# Patient Record
Sex: FEMALE | Race: WHITE | Employment: PART TIME | URBAN - NONMETROPOLITAN AREA
[De-identification: names, ages, dates, MRNs, and addresses within clinical notes are randomized per-mention and may not be internally consistent; named-entity substitution may affect disease eponyms.]

---

## 2019-05-08 ENCOUNTER — HOSPITAL ENCOUNTER (EMERGENCY)
Age: 29
Discharge: HOME | End: 2019-05-08
Attending: FAMILY MEDICINE
Payer: MEDICARE

## 2019-05-08 VITALS
SYSTOLIC BLOOD PRESSURE: 153 MMHG | HEART RATE: 66 BPM | TEMPERATURE: 98.3 F | OXYGEN SATURATION: 98 % | WEIGHT: 293 LBS | HEIGHT: 67 IN | RESPIRATION RATE: 18 BRPM | DIASTOLIC BLOOD PRESSURE: 94 MMHG | BODY MASS INDEX: 45.99 KG/M2

## 2019-05-08 DIAGNOSIS — H10.32 ACUTE BACTERIAL CONJUNCTIVITIS OF LEFT EYE: Primary | ICD-10-CM

## 2019-05-08 PROCEDURE — 99283 EMERGENCY DEPT VISIT LOW MDM: CPT

## 2019-05-08 PROCEDURE — 6370000000 HC RX 637 (ALT 250 FOR IP): Performed by: FAMILY MEDICINE

## 2019-05-08 RX ORDER — ERYTHROMYCIN 5 MG/G
OINTMENT OPHTHALMIC EVERY 6 HOURS
Qty: 1 TUBE | Refills: 0 | Status: SHIPPED | OUTPATIENT
Start: 2019-05-08 | End: 2019-05-15

## 2019-05-08 RX ORDER — ERYTHROMYCIN 5 MG/G
OINTMENT OPHTHALMIC ONCE
Status: COMPLETED | OUTPATIENT
Start: 2019-05-08 | End: 2019-05-08

## 2019-05-08 RX ADMIN — ERYTHROMYCIN: 5 OINTMENT OPHTHALMIC at 23:04

## 2019-05-08 ASSESSMENT — PAIN DESCRIPTION - LOCATION: LOCATION: EYE

## 2019-05-08 ASSESSMENT — PAIN DESCRIPTION - FREQUENCY: FREQUENCY: CONTINUOUS

## 2019-05-08 ASSESSMENT — PAIN DESCRIPTION - PAIN TYPE: TYPE: ACUTE PAIN

## 2019-05-08 ASSESSMENT — PAIN DESCRIPTION - DESCRIPTORS: DESCRIPTORS: SORE

## 2019-05-08 ASSESSMENT — PAIN SCALES - GENERAL: PAINLEVEL_OUTOF10: 4

## 2019-05-08 ASSESSMENT — PAIN DESCRIPTION - ORIENTATION: ORIENTATION: RIGHT

## 2019-05-10 ASSESSMENT — ENCOUNTER SYMPTOMS
ABDOMINAL DISTENTION: 0
COUGH: 0
DIARRHEA: 0
PHOTOPHOBIA: 1
RHINORRHEA: 0
EYE PAIN: 1
ABDOMINAL PAIN: 0
EYE REDNESS: 1
SHORTNESS OF BREATH: 0

## 2019-05-10 NOTE — ED PROVIDER NOTES
[facility] COMPLAINT    Chief Complaint   Patient presents with    Conjunctivitis     Pt states eye redness, irritation and sensitivity to light since this AM;        HPI    Guillermo Srinivasan is a 26 y.o. female who presents to ED she reports redness also some minor irritation of left eye with no known foreign bodies. She thinks she has pinkeye. She is not in her contacts when she started having symptoms. He denies any injury to the eye fevers no cough no shortness of breath no known sick contacts with similar symptoms. PAST MEDICAL HISTORY    History reviewed. No pertinent past medical history. SURGICAL HISTORY    Past Surgical History:   Procedure Laterality Date    KNEE SURGERY Left 2003    KNEE SURGERY Right 2011    x2 one in 2011 and one in 2013       CURRENT MEDICATIONS    Discharge Medication List as of 5/8/2019 11:14 PM      START taking these medications    Details   erythromycin (ROMYCIN) 5 MG/GM ophthalmic ointment Place into the right eye every 6 hours for 7 days, Right Eye, EVERY 6 HOURS Starting Wed 5/8/2019, Until Wed 5/15/2019, For 7 days, Disp-1 Tube, R-0, Print             ALLERGIES    No Known Allergies    FAMILY HISTORY    History reviewed. No pertinent family history.     SOCIAL HISTORY    Social History     Socioeconomic History    Marital status: Single     Spouse name: None    Number of children: None    Years of education: None    Highest education level: None   Occupational History    None   Social Needs    Financial resource strain: None    Food insecurity:     Worry: None     Inability: None    Transportation needs:     Medical: None     Non-medical: None   Tobacco Use    Smoking status: Former Smoker    Smokeless tobacco: Never Used   Substance and Sexual Activity    Alcohol use: Not Currently    Drug use: Never    Sexual activity: None   Lifestyle    Physical activity:     Days per week: None     Minutes per session: None    Stress: None   Relationships    Social connections:     Talks on phone: None     Gets together: None     Attends [employer] service: None     Active member of club or organization: None     Attends meetings of clubs or organizations: None     Relationship status: None    Intimate partner violence:     Fear of current or ex partner: None     Emotionally abused: None     Physically abused: None     Forced sexual activity: None   Other Topics Concern    None   Social History Narrative    None       REVIEW OF SYSTEMS    Review of Systems   Constitutional: Negative for chills, fatigue and fever. HENT: Negative for congestion and rhinorrhea. Eyes: Positive for photophobia, pain and redness. Respiratory: Negative for cough and shortness of breath. Gastrointestinal: Negative for abdominal distention, abdominal pain and diarrhea. Genitourinary: Negative for decreased urine volume and dysuria. Skin: Negative for rash and wound. Neurological: Negative for syncope and headaches. Psychiatric/Behavioral: Negative for agitation and behavioral problems. PHYSICAL EXAM    VITAL SIGNS: BP (!) 153/94   Pulse 66   Temp 98.3 °F (36.8 °C) (Oral)   Resp 18   Ht 5' 7\" (1.702 m)   Wt 298 lb (135.2 kg)   LMP 05/08/2019   SpO2 98%   BMI 46.67 kg/m²    Physical Exam   Constitutional: She is oriented to person, place, and time. She appears well-developed and well-nourished. No distress. HENT:   Head: Normocephalic and atraumatic. Right Ear: External ear normal.   Left Ear: External ear normal.   Eyes: Lids are normal. Lids are everted and swept, no foreign bodies found. Right eye exhibits no discharge. Left eye exhibits no discharge. Left conjunctiva is injected. Left conjunctiva has no hemorrhage. Right eye exhibits normal extraocular motion and no nystagmus. Left eye exhibits normal extraocular motion and no nystagmus. Neck: No JVD present. No tracheal deviation present.    Cardiovascular: Normal rate and regular rhythm. Exam reveals no gallop and no friction rub. No murmur heard. Pulmonary/Chest: Effort normal and breath sounds normal. No respiratory distress. She has no wheezes. She has no rales. Abdominal: Soft. Bowel sounds are normal.   Musculoskeletal: She exhibits no edema. Neurological: She is alert and oriented to person, place, and time. No cranial nerve deficit. cn2-12 grossly intact   Skin: Capillary refill takes less than 2 seconds. No rash noted. She is not diaphoretic. No erythema. Psychiatric: She has a normal mood and affect. Her behavior is normal. Judgment and thought content normal.   Nursing note and vitals reviewed. RADIOLOGY    No orders to display           Labs  No results found for this visit on 05/08/19. Summation      Patient Course: Patient treated with erythromycin ointment and she is to not wear her contacts until she sees her optometrist or ophthalmologist.  She voiced understanding if any time she develops vision loss or severe pain she is to be seen or if she is not getting better she is to be seen. ED Medications administered this visit:    Medications   erythromycin [facility]) ophthalmic ointment ( Right Eye Given 5/8/19 2304)       New Prescriptions from this visit:    Discharge Medication List as of 5/8/2019 11:14 PM      START taking these medications    Details   erythromycin (ROMYCIN) 5 MG/GM ophthalmic ointment Place into the right eye every 6 hours for 7 days, Right Eye, EVERY 6 HOURS Starting Wed 5/8/2019, Until Wed 5/15/2019, For 7 days, Disp-1 Tube, R-0, Print             Follow-up:  [facility]P [facility] ED  [address] [facility] [address] [phone]    As needed, If symptoms worsen        Final Impression:   1.  Acute bacterial conjunctivitis of left eye Stable              (Please note that portions of this note were completed with a voice recognition program.  Efforts were made to edit the dictations but occasionally words are mis-transcribed.)        Luis Alfredo Frazier MD  05/10/19 7106

## 2019-08-28 ENCOUNTER — HOSPITAL ENCOUNTER (EMERGENCY)
Age: 29
Discharge: HOME | End: 2019-08-28
Attending: FAMILY MEDICINE
Payer: MEDICARE

## 2019-08-28 VITALS
TEMPERATURE: 98.1 F | RESPIRATION RATE: 20 BRPM | OXYGEN SATURATION: 97 % | WEIGHT: 293 LBS | BODY MASS INDEX: 45.99 KG/M2 | HEIGHT: 67 IN | HEART RATE: 98 BPM

## 2019-08-28 DIAGNOSIS — L02.91 ABSCESS: Primary | ICD-10-CM

## 2019-08-28 PROCEDURE — 87205 SMEAR GRAM STAIN: CPT

## 2019-08-28 PROCEDURE — 87077 CULTURE AEROBIC IDENTIFY: CPT

## 2019-08-28 PROCEDURE — 99283 EMERGENCY DEPT VISIT LOW MDM: CPT

## 2019-08-28 PROCEDURE — 87186 SC STD MICRODIL/AGAR DIL: CPT

## 2019-08-28 PROCEDURE — 10061 I&D ABSCESS COMP/MULTIPLE: CPT

## 2019-08-28 PROCEDURE — 87070 CULTURE OTHR SPECIMN AEROBIC: CPT

## 2019-08-28 RX ORDER — CEPHALEXIN 500 MG/1
500 CAPSULE ORAL ONCE
Status: DISCONTINUED | OUTPATIENT
Start: 2019-08-28 | End: 2019-08-28 | Stop reason: HOSPADM

## 2019-08-28 RX ORDER — CEPHALEXIN 500 MG/1
500 CAPSULE ORAL 4 TIMES DAILY
Qty: 40 CAPSULE | Refills: 0 | Status: SHIPPED | OUTPATIENT
Start: 2019-08-28 | End: 2020-09-09 | Stop reason: ALTCHOICE

## 2019-08-28 RX ORDER — SULFAMETHOXAZOLE AND TRIMETHOPRIM 800; 160 MG/1; MG/1
1 TABLET ORAL ONCE
Status: DISCONTINUED | OUTPATIENT
Start: 2019-08-28 | End: 2019-08-28 | Stop reason: HOSPADM

## 2019-08-28 RX ORDER — SULFAMETHOXAZOLE AND TRIMETHOPRIM 800; 160 MG/1; MG/1
1 TABLET ORAL 2 TIMES DAILY
Qty: 20 TABLET | Refills: 0 | Status: SHIPPED | OUTPATIENT
Start: 2019-08-28 | End: 2019-09-07

## 2019-08-28 RX ORDER — NITROGLYCERIN 0.4 MG/1
0.4 TABLET SUBLINGUAL EVERY 5 MIN PRN
COMMUNITY
End: 2020-09-09 | Stop reason: ALTCHOICE

## 2019-08-28 ASSESSMENT — PAIN DESCRIPTION - PROGRESSION: CLINICAL_PROGRESSION: GRADUALLY WORSENING

## 2019-08-28 ASSESSMENT — PAIN DESCRIPTION - LOCATION: LOCATION: ARM

## 2019-08-28 ASSESSMENT — PAIN DESCRIPTION - DESCRIPTORS: DESCRIPTORS: CONSTANT

## 2019-08-28 ASSESSMENT — PAIN DESCRIPTION - PAIN TYPE: TYPE: ACUTE PAIN

## 2019-08-28 ASSESSMENT — PAIN SCALES - GENERAL: PAINLEVEL_OUTOF10: 3

## 2019-08-28 ASSESSMENT — PAIN DESCRIPTION - ONSET: ONSET: ON-GOING

## 2019-08-28 ASSESSMENT — PAIN DESCRIPTION - FREQUENCY: FREQUENCY: CONTINUOUS

## 2019-08-28 ASSESSMENT — PAIN DESCRIPTION - ORIENTATION: ORIENTATION: LEFT

## 2019-08-29 NOTE — ED PROVIDER NOTES
[address] [facility]  eMERGENCY dEPARTMENT eNCOUnter          [address] [facility]       Chief Complaint   Patient presents with    Abscess     left arm       Nurses Notes reviewed and I agree except as noted in the HPI. HISTORY OF PRESENT ILLNESS    Deysi London is a 26 y.o. female who presents to the emergency room via private vehicle, patient complaining of abscess of the left volar forearm, patient does acknowledge injecting heroin recently, onset of symptoms 2 days prior. Patient states she did try to take some doxycycline she had left at home from a prior prescription without relief. Denies fever chills denies nausea vomiting. REVIEW OF SYSTEMS     Review of Systems   Skin: Positive for wound. All other systems reviewed and are negative. PAST MEDICAL HISTORY    has no past medical history on file. SURGICAL HISTORY      has a past surgical history that includes knee surgery (Left, 2003) and knee surgery (Right, 2011). CURRENT MEDICATIONS       Discharge Medication List as of 8/28/2019  7:49 PM      CONTINUE these medications which have NOT CHANGED    Details   nitroGLYCERIN (NITROSTAT) 0.4 MG SL tablet Place 0.4 mg under the tongue every 5 minutes as needed for Chest pain up to max of 3 total doses. If no relief after 1 dose, call 911. Historical Med      Buprenorphine HCl-Naloxone HCl (SUBOXONE SL) Place 0.8 mg under the tongueHistorical Med             ALLERGIES     has No Known Allergies. FAMILY HISTORY     has no family status information on file. family history is not on file. SOCIAL HISTORY      reports that she has quit smoking. She has never used smokeless tobacco. She reports that she drank alcohol. She reports that she has current or past drug history. Drug: IV.    PHYSICAL EXAM     INITIAL VITALS:  height is 5' 7\" (1.702 m) and weight is 295 lb 1.6 oz (133.9 kg). Her oral temperature is 98.1 °F (36.7 °C). Her pulse is 98.  Her respiration is 20 and

## 2019-08-31 LAB
CULTURE: ABNORMAL
DIRECT EXAM: ABNORMAL
DIRECT EXAM: ABNORMAL
Lab: ABNORMAL
SPECIMEN DESCRIPTION: ABNORMAL

## 2020-09-09 ENCOUNTER — HOSPITAL ENCOUNTER (EMERGENCY)
Age: 30
Discharge: HOME | End: 2020-09-09
Attending: INTERNAL MEDICINE
Payer: MEDICARE

## 2020-09-09 ENCOUNTER — APPOINTMENT (OUTPATIENT)
Dept: GENERAL RADIOLOGY | Age: 30
End: 2020-09-09
Payer: MEDICARE

## 2020-09-09 VITALS
DIASTOLIC BLOOD PRESSURE: 77 MMHG | OXYGEN SATURATION: 93 % | WEIGHT: 270 LBS | TEMPERATURE: 97.9 F | SYSTOLIC BLOOD PRESSURE: 145 MMHG | HEART RATE: 76 BPM | RESPIRATION RATE: 18 BRPM | BODY MASS INDEX: 40.92 KG/M2 | HEIGHT: 68 IN

## 2020-09-09 PROCEDURE — 73630 X-RAY EXAM OF FOOT: CPT

## 2020-09-09 PROCEDURE — 99283 EMERGENCY DEPT VISIT LOW MDM: CPT

## 2020-09-09 ASSESSMENT — PAIN DESCRIPTION - DESCRIPTORS: DESCRIPTORS: ACHING;DULL

## 2020-09-09 ASSESSMENT — PAIN DESCRIPTION - PAIN TYPE: TYPE: ACUTE PAIN

## 2020-09-09 ASSESSMENT — PAIN DESCRIPTION - ORIENTATION: ORIENTATION: RIGHT

## 2020-09-09 ASSESSMENT — PAIN DESCRIPTION - LOCATION: LOCATION: FOOT

## 2020-09-09 ASSESSMENT — PAIN SCALES - GENERAL: PAINLEVEL_OUTOF10: 5

## 2020-09-09 NOTE — ED PROVIDER NOTES
[facility] ED  EMERGENCY DEPARTMENT ENCOUNTER      Pt Name: Anastacio Bowden  MRN: [id number]  [place] 1990  Date of evaluation: 9/9/2020  Provider: Damaris Jeffers MD    CHIEF COMPLAINT       Chief Complaint   Patient presents with    Foot Pain     Right foot pain for past 3-4 days. Pt slipped in the bathroom and believes she broke her right foot again. HISTORY OF PRESENT ILLNESS   (Location/Symptom, Timing/Onset, Context/Setting, Quality, Duration, Modifying Factors, Severity)  Note limiting factors. Anastacio Bowden is a 31 y.o. female who has a history of right foot fracture, presents to the emergency department for evaluation and management of right foot pain after she slipped in the bathroom 3 to 4 days ago. It is located at the bottom of her right heel region. HPI    Nursing Notes were reviewed. REVIEW OF SYSTEMS    (2-9 systems for level 4, 10 or more for level 5)       REVIEW OF SYSTEMS    Constitutional: Negative for fatigue and fever. Musculoskeletal: Positive for right foot pain, Negative for arthralgias, back pain and neck pain. Skin: Negative for color change, pallor, rash and wound. Neurological: Negative for dizziness, speech difficulty, weakness, distal tingling/numbness and headaches. Except as noted above the remainder of the review of systems was reviewed and negative. PASTMEDICAL HISTORY   History reviewed. No pertinent past medical history. SURGICAL HISTORY       Past Surgical History:   Procedure Laterality Date    KNEE SURGERY Left 2003    KNEE SURGERY Right 2011    x2 one in 2011 and one in 2013         CURRENT MEDICATIONS       Discharge Medication List as of 9/9/2020  8:04 PM      CONTINUE these medications which have NOT CHANGED    Details   Buprenorphine HCl-Naloxone HCl (SUBOXONE SL) Place 0.8 mg under the tongueHistorical Med             ALLERGIES     Patient has no known allergies. FAMILY HISTORY     History reviewed.  No pertinent noninjected. Eyes: Conjunctivae and EOM are normal. Pupils are equal, round, and reactive to light. Neck: Normal range of motion. Neck supple. No tracheal deviation present. Cardiovascular: Normal rate, regular rhythm, normal heartsounds and intact distal pulses. Exam reveals no gallop or friction rub. No murmur heard. Pulmonary/Chest: Effort normal and breath sounds are symmetric and normal. No respiratory distress. There are no wheezes, rales or rhonchi. Abdominal: Soft. Bowel sounds are normal. No distension or no mass exhibitted. There is no tenderness, rebound, rigidity or guarding. Genitourinary:   No CVA tenderness noted on examination. Musculoskeletal: Examination of her right foot shows that there is tenderness along the lateral aspect of her foot. With palpation. No deformities or discoloration are noted. Minimal swelling is identified. Normal range of motion. No edema, tenderness or deformity. Lymphadenopathy:  No cervical adenopathy. Neurological:   alert and oriented to person, place, and time. Normal speech, normal comprehension, normal cognition,  Reflexes are normal.  There are no cranial nerve deficits. Normal muscle tone, motor and sensory function including SILT exhibited. Coordination normal and gait is antalgic. Skin: Skin is warm and dry. No rash noted. No diaphoresis. No erythema. No pallor. Psychiatric: Pleasant and cooperative. Normal mood and affect. Behavior is  normal. Judgment and thought content normal.     DIAGNOSTIC RESULTS     EKG: All EKG's are interpreted by the Emergency Department Physician who either signs or Co-signs this chart in the absence of a cardiologist.    Not indicated. RADIOLOGY:   Non-plain film images such as CT, Ultrasoundand MRI are read by the radiologist. Plain radiographic images are visualized and preliminarily interpreted by the emergency physician with the below findings:    Not indicated.     Interpretation per the Radiologist below, if available at the time of this note:    XR FOOT RIGHT (MIN 3 VIEWS)   Final Result      Unremarkable right foot. ED BEDSIDE ULTRASOUND:   Performed by ED Physician - none    LABS:  Labs Reviewed - No data to display    All other labs were within normal range or not returned as of this dictation. EMERGENCY DEPARTMENT COURSE and DIFFERENTIAL DIAGNOSIS/MDM:   Vitals:    Vitals:    09/09/20 1846   BP: (!) 145/77   Pulse: 76   Resp: 18   Temp: 97.9 °F (36.6 °C)   TempSrc: Oral   SpO2: 93%   Weight: 270 lb (122.5 kg)   Height: 5' 8\" (1.727 m)       Noted    MDM    CRITICAL CARE TIME   Total Critical Care time was 0 minutes      EDCOURSE       CONSULTS:  None    PROCEDURES:  Unless otherwise noted below, none     Procedures      Summation    Manju Quezada is a 31 y.o. female presented for right foot sprain. No identification of fracture or dislocation. She is otherwise well, well hydrated, nontoxic, hemodynamically stable, neurologically intact, and satisfactory for discharge for outpatient management. Findings discussed at length with patient. I recommended weightbearing as tolerated, RICE, over-the-counter analgesics. I instructed the patient to followup with the PCP for evaluation of response to management of acute injury , and for management of hypertension. I instructed the patient to return to the ER if his condition worsens, if there is any concern for altered mental status, difficulty breathing, dehydration or loss of function.         Patient Course:        ED Medicationsadministered this visit:  Medications - No data to display    New Prescriptions from this visit:    Discharge Medication List as of 9/9/2020  8:04 PM          Follow-up:  HOSP GENERAL [facility] ED  [address] [address]  [phone]  Go to   As needed, If symptoms worsen    Mercedes Gaytan MD  [address] [place] [address]  [phone]    Schedule an appointment as soon as possible for a visit in 1 week          Final Impression:   1. Sprain of right foot, initial encounter    2. Essential hypertension               (Please note that portions of this note werecompleted with a voice recognition program.  Efforts were made to edit the dictations but occasionally words are mis-transcribed.)    FINAL IMPRESSION      1. Sprain of right foot, initial encounter    2.  Essential hypertension          DISPOSITION/PLAN   DISPOSITION        PATIENT REFERRED TO:  [facility] ED  [address] [facility]  [phone]  Go to   As needed, If symptoms worsen    Romelia Reyes MD  [address] [place] [phone]    Schedule an appointment as soon as possible for a visit in 1 week        DISCHARGE MEDICATIONS:  Discharge Medication List as of 9/9/2020  8:04 PM             (Please note that portions of this note were completed with a voice recognition program.  Efforts were made to edit the dictations but occasionally words are mis-transcribed.)    Effie Murrieta MD (electronically signed)  Attending Emergency Physician          Effie Murrieta MD  09/10/20 3672

## 2024-06-18 ENCOUNTER — HOSPITAL ENCOUNTER
Age: 34
Discharge: HOME | End: 2024-06-18
Payer: MEDICAID

## 2024-06-18 DIAGNOSIS — N92.0: Primary | ICD-10-CM

## 2024-06-18 DIAGNOSIS — N83.292: ICD-10-CM

## 2024-06-18 LAB
ADD MANUAL DIFF: NO
HEMATOCRIT: 37.9 % (ref 36–48)
HEMOGLOBIN: 12.6 G/DL (ref 12–16)
IMMATURE GRANULOCYTES ABS AUTO: 0.03 10^3/UL (ref 0–0.03)
IMMATURE GRANULOCYTES PCT AUTO: 0.2 % (ref 0–0.5)
INR: 1
LYMPHOCYTES  ABSOLUTE AUTO: 3.2 10^3/UL (ref 1.2–3.8)
MCV RBC: 90.2 FL (ref 81–99)
MEAN CORPUSCULAR HEMOGLOBIN: 30 PG (ref 26.7–34)
MEAN CORPUSCULAR HGB CONC: 33.2 G/DL (ref 29.9–35.2)
PARTIAL THROMBOPLASTIN TIME: 28.2 SEC (ref 22.3–36.2)
PLATELET COUNT: 276 10^3/UL (ref 150–450)
PROTHROMBIN TIME: 10.6 SEC (ref 9–11.6)
RED BLOOD COUNT: 4.2 10^6/UL (ref 4.2–5.4)
THYROID STIMULATING HORMONE: 1.05 UIU/ML (ref 0.36–3.74)
WHITE BLOOD COUNT: 12.8 10^3/UL (ref 4–11)

## 2024-06-18 PROCEDURE — 85025 COMPLETE CBC W/AUTO DIFF WBC: CPT

## 2024-06-18 PROCEDURE — 85730 THROMBOPLASTIN TIME PARTIAL: CPT

## 2024-06-18 PROCEDURE — 76856 US EXAM PELVIC COMPLETE: CPT

## 2024-06-18 PROCEDURE — 76830 TRANSVAGINAL US NON-OB: CPT

## 2024-06-18 PROCEDURE — 84443 ASSAY THYROID STIM HORMONE: CPT

## 2024-06-18 PROCEDURE — 84439 ASSAY OF FREE THYROXINE: CPT

## 2024-06-18 PROCEDURE — 36415 COLL VENOUS BLD VENIPUNCTURE: CPT

## 2024-06-18 PROCEDURE — 84702 CHORIONIC GONADOTROPIN TEST: CPT

## 2024-06-18 PROCEDURE — 83036 HEMOGLOBIN GLYCOSYLATED A1C: CPT

## 2024-06-18 PROCEDURE — 85610 PROTHROMBIN TIME: CPT

## 2024-12-12 ENCOUNTER — HOSPITAL ENCOUNTER (OUTPATIENT)
Dept: HOSPITAL 101 - LAB | Age: 34
LOS: 19 days | Discharge: HOME | End: 2024-12-31
Payer: MEDICAID

## 2024-12-12 DIAGNOSIS — B18.2: Primary | ICD-10-CM

## 2024-12-12 LAB
ALANINE AMINOTRANSFERASE: 18 U/L (ref 14–59)
ALBUMIN GLOBULIN RATIO: 0.9
ALBUMIN LEVEL: 3.8 G/DL (ref 3.4–5)
ALKALINE PHOSPHATASE: 62 U/L (ref 46–116)
ASPARTATE AMINO TRANSFERASE: 18 U/L (ref 15–37)
GLOBULIN: 4.2 G/DL
TOTAL PROTEIN: 8 G/DL (ref 6.4–8.2)

## 2024-12-12 PROCEDURE — 82105 ALPHA-FETOPROTEIN SERUM: CPT

## 2024-12-12 PROCEDURE — 80076 HEPATIC FUNCTION PANEL: CPT

## 2024-12-12 PROCEDURE — 87522 HEPATITIS C REVRS TRNSCRPJ: CPT

## 2024-12-12 PROCEDURE — 36415 COLL VENOUS BLD VENIPUNCTURE: CPT

## 2024-12-13 LAB — AFP, SERUM, TUMOR MARKER: 2.1 NG/ML (ref 0–6.4)

## 2025-02-19 ENCOUNTER — HOSPITAL ENCOUNTER (EMERGENCY)
Age: 35
Discharge: HOME | End: 2025-02-19
Payer: MEDICAID

## 2025-02-19 VITALS
OXYGEN SATURATION: 100 % | HEART RATE: 103 BPM | SYSTOLIC BLOOD PRESSURE: 101 MMHG | TEMPERATURE: 98.2 F | DIASTOLIC BLOOD PRESSURE: 69 MMHG

## 2025-02-19 VITALS — BODY MASS INDEX: 32.1 KG/M2

## 2025-02-19 DIAGNOSIS — S61.411A: Primary | ICD-10-CM
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
## 2025-02-19 DIAGNOSIS — Z23: ICD-10-CM

## 2025-02-19 PROCEDURE — 12001 RPR S/N/AX/GEN/TRNK 2.5CM/<: CPT

## 2025-02-19 PROCEDURE — 90715 TDAP VACCINE 7 YRS/> IM: CPT

## 2025-02-19 PROCEDURE — 90471 IMMUNIZATION ADMIN: CPT

## 2025-02-19 PROCEDURE — 73130 X-RAY EXAM OF HAND: CPT

## 2025-02-19 PROCEDURE — 99283 EMERGENCY DEPT VISIT LOW MDM: CPT
